# Patient Record
Sex: FEMALE | Race: WHITE | Employment: FULL TIME | URBAN - METROPOLITAN AREA
[De-identification: names, ages, dates, MRNs, and addresses within clinical notes are randomized per-mention and may not be internally consistent; named-entity substitution may affect disease eponyms.]

---

## 2017-10-03 ENCOUNTER — TELEPHONE (OUTPATIENT)
Dept: OBGYN | Age: 42
End: 2017-10-03

## 2017-10-03 DIAGNOSIS — N76.0 ACUTE VAGINITIS: Primary | ICD-10-CM

## 2017-10-03 RX ORDER — METRONIDAZOLE 500 MG/1
TABLET ORAL
Qty: 4 TABLET | Refills: 0 | Status: SHIPPED | OUTPATIENT
Start: 2017-10-03 | End: 2017-10-17 | Stop reason: ALTCHOICE

## 2017-10-17 ENCOUNTER — OFFICE VISIT (OUTPATIENT)
Dept: OBGYN | Age: 42
End: 2017-10-17
Payer: COMMERCIAL

## 2017-10-17 ENCOUNTER — HOSPITAL ENCOUNTER (OUTPATIENT)
Age: 42
Setting detail: SPECIMEN
Discharge: HOME | End: 2017-10-17
Payer: COMMERCIAL

## 2017-10-17 VITALS
HEIGHT: 63 IN | BODY MASS INDEX: 21.09 KG/M2 | WEIGHT: 119 LBS | DIASTOLIC BLOOD PRESSURE: 76 MMHG | SYSTOLIC BLOOD PRESSURE: 108 MMHG

## 2017-10-17 DIAGNOSIS — N76.0 ACUTE VAGINITIS: Primary | ICD-10-CM

## 2017-10-17 DIAGNOSIS — N76.0 ACUTE VAGINITIS: ICD-10-CM

## 2017-10-17 PROCEDURE — 99214 OFFICE O/P EST MOD 30 MIN: CPT | Performed by: ADVANCED PRACTICE MIDWIFE

## 2017-10-17 PROCEDURE — 87510 GARDNER VAG DNA DIR PROBE: CPT

## 2017-10-17 PROCEDURE — 87591 N.GONORRHOEAE DNA AMP PROB: CPT

## 2017-10-17 PROCEDURE — 87660 TRICHOMONAS VAGIN DIR PROBE: CPT

## 2017-10-17 PROCEDURE — 87070 CULTURE OTHR SPECIMN AEROBIC: CPT

## 2017-10-17 PROCEDURE — 87491 CHLMYD TRACH DNA AMP PROBE: CPT

## 2017-10-17 PROCEDURE — 87480 CANDIDA DNA DIR PROBE: CPT

## 2017-10-17 RX ORDER — FLUCONAZOLE 150 MG/1
150 TABLET ORAL ONCE
Qty: 1 TABLET | Refills: 0 | Status: SHIPPED | OUTPATIENT
Start: 2017-10-17 | End: 2017-10-17

## 2017-10-17 ASSESSMENT — PATIENT HEALTH QUESTIONNAIRE - PHQ9
2. FEELING DOWN, DEPRESSED OR HOPELESS: 0
SUM OF ALL RESPONSES TO PHQ9 QUESTIONS 1 & 2: 0
SUM OF ALL RESPONSES TO PHQ QUESTIONS 1-9: 0
1. LITTLE INTEREST OR PLEASURE IN DOING THINGS: 0

## 2017-10-17 NOTE — PROGRESS NOTES
PROBLEM VISIT     Date of service: 10/17/2017    Joselyn Whipple  Is a 40 y.o. single female    PT's PCP is: Gracie Perez MD     : 1975                                             Subjective:       No LMP recorded. Patient has had a hysterectomy. OB History    Para Term  AB Living   5       1 3   SAB TAB Ectopic Molar Multiple Live Births   1                # Outcome Date GA Lbr Kevin/2nd Weight Sex Delivery Anes PTL Lv   5             4             3             2             1 SAB                    History   Smoking Status    Current Every Day Smoker    Packs/day: 5.00   Smokeless Tobacco    Never Used        History   Alcohol Use No       Allergies: Triple antibiotic [bacitracin-neomycin-polymyxin]      Current Outpatient Prescriptions:     fluconazole (DIFLUCAN) 150 MG tablet, Take 1 tablet by mouth once for 1 dose, Disp: 1 tablet, Rfl: 0    History   Sexual Activity    Sexual activity: Yes    Partners: Male       Last Yearly:  2016    Last pap:     Last HPV:     Chief Complaint   Patient presents with    Vaginal Discharge     C/O vaginal burning and white vaginal discharge. Symptoms for several weeks. Pt. used Flagyl without relief. PE:  Vital Signs  Blood pressure 108/76, height 5' 3\" (1.6 m), weight 119 lb (54 kg), not currently breastfeeding. Labs:    No results found for this visit on 10/17/17. NURSE: Ruthy TIRADO    HPI: here for vaginal burning, is concerned about hx of warts     PT denies fever, chills, nausea and vomiting       Objective   Pelvic Exam: GENITAL/URINARY:  External Genitalia:  General appearance; normal, Hair distribution; normal, Lesions absent  Urethral Meatus:  Size normal, Location normal, Lesions absent, Prolapse absent  Urethra:   Fullness absent, Masses absent  Bladder:  Fullness absent, Masses absent, Tenderness absent, Cystocele absent  Vagina:  General appearance normal, Lesions absent, Pelvic support normal, white discharge and errythema  Anus/Perineum:  Lesions absent and Masses absent                                                           Assessment and Plan         1. Acute vaginitis  Aerobic Culture    C.trachomatis N.gonorrhoeae DNA    VAGINITIS DNA PROBE             I have discontinued Ms. Browne's Multiple Vitamins-Minerals (MULTI COMPLETE PO), Probiotic Product (PROBIOTIC DAILY PO), Garlic, folic acid, and metroNIDAZOLE. I am also having her start on fluconazole. No Follow-up on file. There are no Patient Instructions on file for this visit. Over 50% of time spent on counseling and care coordination on: see assessment and plan,  She was also counseled on her preventative health maintenance recommendations and follow-up.         FF time: 15 min      Wale Nguyen,10/17/2017 10:57 AM

## 2017-10-18 ENCOUNTER — TELEPHONE (OUTPATIENT)
Dept: OBGYN | Age: 42
End: 2017-10-18

## 2017-10-18 DIAGNOSIS — N76.0 BV (BACTERIAL VAGINOSIS): Primary | ICD-10-CM

## 2017-10-18 DIAGNOSIS — B96.89 BV (BACTERIAL VAGINOSIS): Primary | ICD-10-CM

## 2017-10-18 LAB
C TRACH DNA GENITAL QL NAA+PROBE: NEGATIVE
DIRECT EXAM: ABNORMAL
Lab: ABNORMAL
N. GONORRHOEAE DNA: NEGATIVE
SPECIMEN DESCRIPTION: ABNORMAL
SPECIMEN DESCRIPTION: ABNORMAL
STATUS: ABNORMAL

## 2017-10-18 RX ORDER — METRONIDAZOLE 500 MG/1
TABLET ORAL
Qty: 4 TABLET | Refills: 3 | Status: SHIPPED | OUTPATIENT
Start: 2017-10-18 | End: 2018-08-29 | Stop reason: ALTCHOICE

## 2017-10-20 LAB
CULTURE: NORMAL
Lab: NORMAL
SPECIMEN DESCRIPTION: NORMAL
SPECIMEN DESCRIPTION: NORMAL
STATUS: NORMAL

## 2018-01-18 DIAGNOSIS — B37.31 YEAST VAGINITIS: Primary | ICD-10-CM

## 2018-01-18 RX ORDER — FLUCONAZOLE 150 MG/1
150 TABLET ORAL ONCE
Qty: 1 TABLET | Refills: 0 | Status: SHIPPED | OUTPATIENT
Start: 2018-01-18 | End: 2018-01-18

## 2018-07-30 ENCOUNTER — TELEPHONE (OUTPATIENT)
Dept: OBGYN | Age: 43
End: 2018-07-30

## 2018-07-30 DIAGNOSIS — B37.31 VAGINAL YEAST INFECTION: ICD-10-CM

## 2018-07-30 DIAGNOSIS — N76.0 BV (BACTERIAL VAGINOSIS): Primary | ICD-10-CM

## 2018-07-30 DIAGNOSIS — B96.89 BV (BACTERIAL VAGINOSIS): Primary | ICD-10-CM

## 2018-07-30 RX ORDER — METRONIDAZOLE 500 MG/1
2000 TABLET ORAL ONCE
Qty: 4 TABLET | Refills: 0 | Status: SHIPPED | OUTPATIENT
Start: 2018-07-30 | End: 2018-07-30

## 2018-07-30 RX ORDER — FLUCONAZOLE 150 MG/1
150 TABLET ORAL ONCE
Qty: 1 TABLET | Refills: 0 | Status: SHIPPED | OUTPATIENT
Start: 2018-07-30 | End: 2018-07-30

## 2018-07-30 NOTE — TELEPHONE ENCOUNTER
Patient calling asking for prescription for BV, also would like Diflucan for prevention. Will you give something?

## 2018-08-29 ENCOUNTER — OFFICE VISIT (OUTPATIENT)
Dept: OBGYN | Age: 43
End: 2018-08-29
Payer: COMMERCIAL

## 2018-08-29 VITALS
WEIGHT: 124 LBS | DIASTOLIC BLOOD PRESSURE: 60 MMHG | HEIGHT: 63 IN | BODY MASS INDEX: 21.97 KG/M2 | SYSTOLIC BLOOD PRESSURE: 104 MMHG

## 2018-08-29 DIAGNOSIS — N63.15 BREAST LUMP ON RIGHT SIDE AT 9 O'CLOCK POSITION: Primary | ICD-10-CM

## 2018-08-29 PROCEDURE — 99213 OFFICE O/P EST LOW 20 MIN: CPT | Performed by: ADVANCED PRACTICE MIDWIFE

## 2018-08-29 NOTE — PROGRESS NOTES
and plan,  She was also counseled on her preventative health maintenance recommendations and follow-up.         FF time: 15 min      Agatha Nguyen,9/9/2018 7:13 PM

## 2018-08-31 ENCOUNTER — HOSPITAL ENCOUNTER (OUTPATIENT)
Dept: WOMENS IMAGING | Age: 43
Discharge: HOME | End: 2018-09-02
Payer: COMMERCIAL

## 2018-08-31 ENCOUNTER — HOSPITAL ENCOUNTER (OUTPATIENT)
Dept: ULTRASOUND IMAGING | Age: 43
Discharge: HOME | End: 2018-09-02
Payer: COMMERCIAL

## 2018-08-31 DIAGNOSIS — N63.15 BREAST LUMP ON RIGHT SIDE AT 9 O'CLOCK POSITION: ICD-10-CM

## 2018-08-31 PROCEDURE — 76641 ULTRASOUND BREAST COMPLETE: CPT

## 2018-08-31 PROCEDURE — 77066 DX MAMMO INCL CAD BI: CPT

## 2018-09-04 DIAGNOSIS — N63.0 BREAST MASS IN FEMALE: Primary | ICD-10-CM

## 2018-09-27 ENCOUNTER — TELEPHONE (OUTPATIENT)
Dept: OBGYN | Age: 43
End: 2018-09-27

## 2018-10-03 NOTE — TELEPHONE ENCOUNTER
Spoke with Edouard Mckenzie, she stated that her symptoms are  Gone. She already has $1000.00 of bills related to this so is concerned about more medical costs. Patient states if she waits until after Oct. 15 th.she can have a mammogram and insurance will pay. How would you like to handle it. ?

## 2018-10-23 ENCOUNTER — TELEPHONE (OUTPATIENT)
Dept: OBGYN | Age: 43
End: 2018-10-23

## 2018-10-23 DIAGNOSIS — Z87.898 HISTORY OF LUMP OF RIGHT BREAST: Primary | ICD-10-CM

## 2018-11-06 ENCOUNTER — HOSPITAL ENCOUNTER (OUTPATIENT)
Dept: ULTRASOUND IMAGING | Age: 43
Discharge: HOME | End: 2018-11-08
Payer: COMMERCIAL

## 2018-11-06 DIAGNOSIS — Z87.898 HISTORY OF LUMP OF RIGHT BREAST: ICD-10-CM

## 2018-11-06 PROCEDURE — 76641 ULTRASOUND BREAST COMPLETE: CPT

## 2022-08-10 ENCOUNTER — HOSPITAL ENCOUNTER (OUTPATIENT)
Dept: WOMENS IMAGING | Age: 47
Discharge: HOME | End: 2022-08-12
Payer: COMMERCIAL

## 2022-08-10 DIAGNOSIS — Z12.31 BREAST CANCER SCREENING BY MAMMOGRAM: ICD-10-CM

## 2022-08-10 PROCEDURE — 77067 SCR MAMMO BI INCL CAD: CPT

## 2023-02-08 ENCOUNTER — HOSPITAL ENCOUNTER (OUTPATIENT)
Age: 48
Setting detail: SPECIMEN
Discharge: HOME | End: 2023-02-08
Payer: COMMERCIAL

## 2023-02-08 ENCOUNTER — OFFICE VISIT (OUTPATIENT)
Dept: OBGYN | Age: 48
End: 2023-02-08
Payer: COMMERCIAL

## 2023-02-08 VITALS
SYSTOLIC BLOOD PRESSURE: 120 MMHG | WEIGHT: 115.6 LBS | HEIGHT: 63 IN | DIASTOLIC BLOOD PRESSURE: 78 MMHG | BODY MASS INDEX: 20.48 KG/M2

## 2023-02-08 DIAGNOSIS — Z01.419 ENCOUNTER FOR ANNUAL ROUTINE GYNECOLOGICAL EXAMINATION: Primary | ICD-10-CM

## 2023-02-08 DIAGNOSIS — R23.2 HOT FLASHES: ICD-10-CM

## 2023-02-08 DIAGNOSIS — Z12.31 SCREENING MAMMOGRAM, ENCOUNTER FOR: ICD-10-CM

## 2023-02-08 DIAGNOSIS — Z12.72 SCREENING FOR VAGINAL CANCER: ICD-10-CM

## 2023-02-08 PROCEDURE — 82670 ASSAY OF TOTAL ESTRADIOL: CPT

## 2023-02-08 PROCEDURE — G8484 FLU IMMUNIZE NO ADMIN: HCPCS | Performed by: ADVANCED PRACTICE MIDWIFE

## 2023-02-08 PROCEDURE — 36415 COLL VENOUS BLD VENIPUNCTURE: CPT | Performed by: ADVANCED PRACTICE MIDWIFE

## 2023-02-08 PROCEDURE — 83002 ASSAY OF GONADOTROPIN (LH): CPT

## 2023-02-08 PROCEDURE — 99396 PREV VISIT EST AGE 40-64: CPT | Performed by: ADVANCED PRACTICE MIDWIFE

## 2023-02-08 PROCEDURE — G0145 SCR C/V CYTO,THINLAYER,RESCR: HCPCS

## 2023-02-08 PROCEDURE — 83001 ASSAY OF GONADOTROPIN (FSH): CPT

## 2023-02-08 ASSESSMENT — ENCOUNTER SYMPTOMS
BACK PAIN: 0
SHORTNESS OF BREATH: 0
ABDOMINAL PAIN: 0

## 2023-02-08 NOTE — PROGRESS NOTES
YEARLY PHYSICAL    Date of service: 2023    Loli Velez  Is a 56 y.o.  single, female    PT's PCP is: Margaret Monterroso MD     : 1975                                             Subjective:       No LMP recorded. Patient has had a hysterectomy. Are your menses regular: not applicable    OB History    Para Term  AB Living   5 3 3   2 2   SAB IAB Ectopic Molar Multiple Live Births   1 1              # Outcome Date GA Lbr Kevin/2nd Weight Sex Delivery Anes PTL Lv   5 Term            4 Term            3 Term            2 IAB            1 SAB                 Social History     Tobacco Use   Smoking Status Every Day    Packs/day: 5.00    Types: Cigarettes   Smokeless Tobacco Never        Social History     Substance and Sexual Activity   Alcohol Use No       Family History   Problem Relation Age of Onset    Hypertension Father     Stroke Father     Other Other         PGGM ovarian cancer. No family h/o breast cancer. Any family history of breast or ovarian cancer: No    Any family history of blood clots: Yes    Have you had a positive covid test: No    Have you had the covid immunization: Yes      Allergies: Triple antibiotic [bacitracin-neomycin-polymyxin]    No current outpatient medications on file. Social History     Substance and Sexual Activity   Sexual Activity Not Currently    Partners: Male       Any bleeding or pain with intercourse: na    Last Yearly date:  2018    Last pap date and results: 2018?     Last HPV date and results: unknown    Last Mammogram date and results: 2022 negative    Last Dexa scan date and results: never    Last colorectal screen- type:never*  date  never results never    Do you do self breast exams: Yes    Past Medical History:   Diagnosis Date    Headache(784.0)     Mitral valvular prolapse        Past Surgical History:   Procedure Laterality Date    CYST REMOVAL      neck Viv [place]    TUBAL LIGATION      WISDOM TOOTH EXTRACTION         Family History   Problem Relation Age of Onset    Hypertension Father     Stroke Father     Other Other         PGGM ovarian cancer. No family h/o breast cancer. Chief Complaint   Patient presents with    Gynecologic Exam     Yearly exam. Last pap ? PE:  Vital Signs  Blood pressure 120/78, height 5' 3\" (1.6 m), weight 115 lb 9.6 oz (52.4 kg), not currently breastfeeding. Estimated body mass index is 20.48 kg/m² as calculated from the following:    Height as of this encounter: 5' 3\" (1.6 m). Weight as of this encounter: 115 lb 9.6 oz (52.4 kg). Labs:    No results found for this visit on 02/08/23. No data recorded    NURSE: Ursula TIRADO    HPI: here for annual gyn exam, denies c/o    Review of Systems   Constitutional: Negative. HENT:  Negative for congestion. Respiratory:  Negative for shortness of breath. Cardiovascular:  Negative for chest pain. Gastrointestinal:  Negative for abdominal pain. Genitourinary:  Negative for dysuria. Musculoskeletal:  Negative for back pain. Skin:  Negative for rash. Neurological:  Negative for headaches. Psychiatric/Behavioral:  The patient is not nervous/anxious. Objective  Lymphatic:   no lymphadenopathy  Heent:   negative   Cor: regular rate and rhythm, no murmurs              Pul:clear to auscultation bilaterally- no wheezes, rales or rhonchi, normal air movement, no respiratory distress      GI: Abdomen soft, non-tender. BS normal. No masses,  No organomegaly           Extremities: normal strength, tone, and muscle mass   Breasts: Breast:normal appearance, no masses or tenderness   Pelvic Exam: GENITAL/URINARY:  External Genitalia:  General appearance; normal, Hair distribution; normal, Lesions absent  Urethral Meatus:  Size normal, Location normal, Lesions absent, Prolapse absent  Urethra:   Fullness absent, Masses absent  Bladder:  Fullness absent, Masses absent, Tenderness absent, Cystocele absent  Vagina:  General appearance normal, Estrogen effect normal, Discharge absent, Lesions absent, Pelvic support normal  Cervix:  General appearance normal, Lesions absent, Discharge absent, Tenderness absent, Enlargement absent, Nodularity absent  Uterus:  Size normal, Tenderness absent  Adenexa: Masses absent, Tenderness absent  Anus/Perineum:  Lesions absent and Masses absent                                                       Assessment and Plan          Diagnosis Orders   1. Encounter for annual routine gynecological examination        2. Screening for vaginal cancer  PAP SMEAR      3. Screening mammogram, encounter for  [facility] PRITESH DIGITAL SCREEN BILATERAL      4. Hot flashes  Follicle Stimulating Hormone    Luteinizing Hormone    Estradiol                Fiordaliza Sweeney does not currently have medications on file. Return in about 1 year (around 2/8/2024) for yearly. She was also counseled on her preventative health maintenance recommendations and follow-up. There are no Patient Instructions on file for this visit.     Lennox Sauger, APRN - CNM,2/8/2023 3:27 PM

## 2023-02-09 DIAGNOSIS — Z78.0 MENOPAUSE: Primary | ICD-10-CM

## 2023-02-09 LAB
ESTRADIOL LEVEL: <5 PG/ML (ref 27–314)
FOLLICLE STIMULATING HORMONE: 101 MIU/ML (ref 1.7–21.5)
LH: 51.1 MIU/ML (ref 1–95.6)

## 2023-02-13 DIAGNOSIS — Z78.0 MENOPAUSE: Primary | ICD-10-CM

## 2024-01-03 ENCOUNTER — TELEPHONE (OUTPATIENT)
Dept: SURGERY | Age: 49
End: 2024-01-03

## 2024-01-03 NOTE — TELEPHONE ENCOUNTER
[facility] Colon[facility] Worksheet    Patient Name: Joaquina Browne  : 1975  Primary Care Physician: Blake Cleaning MD  Today's Date: 1/3/2024    Surgery Location:   [facility]  [x] [place] [] Other    Why has a Colonoscopy been recommended for you?   - Colonoscopy Screening    Are you currently having any of the following symptoms?  [] Rectal Bleeding (K62.5) [] Bloody Stool (K92.1) [] Dark Tarry Stool (K92.1)  [] Fecal Occult Positive Blood (confirmed by blood test R19.5)  [x] Anemia (low hemoglobin D64.9) [] Abdominal Pain (R10.84) [] Diarrhea (R19.7)    Have had a colonoscopy before?  [] Yes  [x] No    If so, where and when was that done?   -     Was anything found during the last scope?  -     Do you have a family history of colon cancer?   - no    Family History   Problem Relation Age of Onset    Hypertension Father     Stroke Father     Other Other         PGGM ovarian cancer. No family h/o breast cancer.          How much caffeine do you drink in a day?   - 2 cups of coffee, 1-2 cans of soda    Any tobacco use?    [x] Yes    [] No  1/2 a pack a day  Any alcohol use?     [] Yes    [x] No  If yes, how often?       In the last six (6) months have you experienced any of the following symptoms?   [] Blood from the rectum or stool  [] Abdominal Pain   [] Diarrhea  [] Itching of rectum   [] Vomiting  [] Constipation   [] Black stools  [] Bloating   [] Mucous in your stool  [x] [place]   [] Change in bowel habits    Do you have any medication allergies?   [] Yes  If yes, please list:   [x] No    Do you take Warfarin, Coumadin, Plavix, Eliquis, Xarelto, or aspirin OR do you take a medication that thins your blood?  [] Yes  [x] No    Medications:    No current outpatient medications on file prior to visit.     No current facility-administered medications on file prior to visit.         Surgical History:    Past Surgical History:   Procedure Laterality Date    CYST REMOVAL      neck    ENDOMETRIAL

## 2024-01-22 RX ORDER — IBUPROFEN 200 MG
200 TABLET ORAL EVERY 6 HOURS PRN
COMMUNITY

## 2024-01-22 NOTE — PROGRESS NOTES
Patient states they received their colon prep instructions and home medications that are to be taken on the day of their procedure with a small sip of water only, from the physician's office. Instructed pt to stop taking ibuprofen 7 days prior to surgery and to stop marijuana 5 days prior to surgery.

## 2024-01-23 ENCOUNTER — HOSPITAL ENCOUNTER (OUTPATIENT)
Dept: WOMENS IMAGING | Age: 49
Discharge: HOME | End: 2024-01-25
Attending: ADVANCED PRACTICE MIDWIFE
Payer: COMMERCIAL

## 2024-01-23 DIAGNOSIS — Z78.0 MENOPAUSE: ICD-10-CM

## 2024-01-23 DIAGNOSIS — Z12.31 SCREENING MAMMOGRAM, ENCOUNTER FOR: ICD-10-CM

## 2024-01-23 PROCEDURE — 77063 BREAST TOMOSYNTHESIS BI: CPT

## 2024-01-23 PROCEDURE — 77080 DXA BONE DENSITY AXIAL: CPT

## 2024-02-05 ENCOUNTER — ANESTHESIA EVENT (OUTPATIENT)
Dept: OPERATING ROOM | Age: 49
End: 2024-02-05
Payer: COMMERCIAL

## 2024-02-05 PROBLEM — Z12.11 COLON CANCER SCREENING: Status: ACTIVE | Noted: 2024-02-05

## 2024-02-06 ENCOUNTER — ANESTHESIA (OUTPATIENT)
Dept: OPERATING ROOM | Age: 49
End: 2024-02-06
Payer: COMMERCIAL

## 2024-02-06 ENCOUNTER — HOSPITAL ENCOUNTER (OUTPATIENT)
Age: 49
Setting detail: OUTPATIENT SURGERY
Discharge: HOME | End: 2024-02-06
Attending: SURGERY | Admitting: SURGERY
Payer: COMMERCIAL

## 2024-02-06 VITALS
BODY MASS INDEX: 22.15 KG/M2 | TEMPERATURE: 97.3 F | OXYGEN SATURATION: 98 % | RESPIRATION RATE: 16 BRPM | SYSTOLIC BLOOD PRESSURE: 104 MMHG | HEIGHT: 63 IN | HEART RATE: 73 BPM | WEIGHT: 125 LBS | DIASTOLIC BLOOD PRESSURE: 64 MMHG

## 2024-02-06 PROCEDURE — 2500000003 HC RX 250 WO HCPCS: Performed by: NURSE ANESTHETIST, CERTIFIED REGISTERED

## 2024-02-06 PROCEDURE — 7100000010 HC PHASE II RECOVERY - FIRST 15 MIN: Performed by: SURGERY

## 2024-02-06 PROCEDURE — 3700000000 HC ANESTHESIA ATTENDED CARE: Performed by: SURGERY

## 2024-02-06 PROCEDURE — 2580000003 HC RX 258: Performed by: NURSE ANESTHETIST, CERTIFIED REGISTERED

## 2024-02-06 PROCEDURE — 7100000011 HC PHASE II RECOVERY - ADDTL 15 MIN: Performed by: SURGERY

## 2024-02-06 PROCEDURE — 3609156700 HC PROCTOSIGMOIDOSCOPY DX: Performed by: SURGERY

## 2024-02-06 PROCEDURE — 6360000002 HC RX W HCPCS: Performed by: NURSE ANESTHETIST, CERTIFIED REGISTERED

## 2024-02-06 PROCEDURE — 2709999900 HC NON-CHARGEABLE SUPPLY: Performed by: SURGERY

## 2024-02-06 RX ORDER — SODIUM CHLORIDE 9 MG/ML
INJECTION, SOLUTION INTRAVENOUS PRN
Status: DISCONTINUED | OUTPATIENT
Start: 2024-02-06 | End: 2024-02-06 | Stop reason: HOSPADM

## 2024-02-06 RX ORDER — SODIUM CHLORIDE 0.9 % (FLUSH) 0.9 %
5-40 SYRINGE (ML) INJECTION EVERY 12 HOURS SCHEDULED
Status: DISCONTINUED | OUTPATIENT
Start: 2024-02-06 | End: 2024-02-06 | Stop reason: HOSPADM

## 2024-02-06 RX ORDER — SODIUM CHLORIDE, SODIUM LACTATE, POTASSIUM CHLORIDE, CALCIUM CHLORIDE 600; 310; 30; 20 MG/100ML; MG/100ML; MG/100ML; MG/100ML
INJECTION, SOLUTION INTRAVENOUS CONTINUOUS
Status: DISCONTINUED | OUTPATIENT
Start: 2024-02-06 | End: 2024-02-06 | Stop reason: HOSPADM

## 2024-02-06 RX ORDER — PROPOFOL 10 MG/ML
INJECTION, EMULSION INTRAVENOUS PRN
Status: DISCONTINUED | OUTPATIENT
Start: 2024-02-06 | End: 2024-02-06 | Stop reason: SDUPTHER

## 2024-02-06 RX ORDER — SODIUM CHLORIDE 0.9 % (FLUSH) 0.9 %
5-40 SYRINGE (ML) INJECTION PRN
Status: DISCONTINUED | OUTPATIENT
Start: 2024-02-06 | End: 2024-02-06 | Stop reason: HOSPADM

## 2024-02-06 RX ADMIN — LIDOCAINE HYDROCHLORIDE 100 MG: 20 INJECTION, SOLUTION EPIDURAL; INFILTRATION; INTRACAUDAL at 11:38

## 2024-02-06 RX ADMIN — PROPOFOL 60 MG: 10 INJECTION, EMULSION INTRAVENOUS at 11:38

## 2024-02-06 RX ADMIN — PROPOFOL 180 MCG/KG/MIN: 10 INJECTION, EMULSION INTRAVENOUS at 11:39

## 2024-02-06 RX ADMIN — PHENYLEPHRINE HYDROCHLORIDE 100 MCG: 10 INJECTION INTRAVENOUS at 11:41

## 2024-02-06 RX ADMIN — SODIUM CHLORIDE, POTASSIUM CHLORIDE, SODIUM LACTATE AND CALCIUM CHLORIDE: 600; 310; 30; 20 INJECTION, SOLUTION INTRAVENOUS at 10:33

## 2024-02-06 ASSESSMENT — LIFESTYLE VARIABLES: SMOKING_STATUS: 1

## 2024-02-06 ASSESSMENT — PAIN - FUNCTIONAL ASSESSMENT
PAIN_FUNCTIONAL_ASSESSMENT: FACE, LEGS, ACTIVITY, CRY, AND CONSOLABILITY (FLACC)
PAIN_FUNCTIONAL_ASSESSMENT: NONE - DENIES PAIN
PAIN_FUNCTIONAL_ASSESSMENT: 0-10

## 2024-02-06 NOTE — H&P
masses, no hernias, no HSM, no bruits.    Neuro: Normal speech, motor/sensory grossly normal bilateral    MSK: No joint tenderness, deformity, or swelling           ASSESSMENT:  1) Colon Cancer Screening    PLAN:  1) Colon Cancer Screening - Risks and benefits of colonoscopy were discussed with Joaquina Browne.  In particular I discussed the possibility of incomplete colonoscopy and failure to make a diagnosis.  I also discussed the risks and consequences of reactions to the sedatives, bleeding and perforation.  Alternate ways of evaluating the colon including barium enema, CT colonography and sigmoidoscopy were discussed.  she was also given the opportunity to have any questions answered, and encouraged to call the office with additional issues.      Electronically signed by FLORENCIA THOMAS MD on 2/6/2024 at 10:46 AM

## 2024-02-06 NOTE — OP NOTE
Operative Note      Patient: Joaquina Browne  YOB: 1975  MRN: [id number]    Date of Procedure: 2/6/2024    Pre-Op Diagnosis Codes:     * Screening for colon cancer [Z12.11]    Post-Op Diagnosis:  Procedure aborted due to inadequate prep       Procedure: Incomplete colonoscopy    Surgeon(s):  Flornecia Diaz MD    Assistant:   * No surgical staff found *    Anesthesia: Monitor Anesthesia Care    Estimated Blood Loss (mL): None    Complications: Other: Inadequate prer    Specimens:   * No specimens in log *    Implants:  * No implants in log *      Drains: * No LDAs found *    Patient was brought to the endoscopy area and IV sedation was used centimeters by the CRNA.  Scope was put in his rectum.  Put the scope in the rectum and there was formed stool there is hoping just a small amount and I can get past it but once I get the scope in the rectum there was more formed stool the completely filled the lumen and I cannot see beyond the stool.  The scope was only passed about 15 cm before I stopped the procedure.  She will need to try this again with an adequate prep    Electronically signed by FLORENCIA DIAZ MD on 2/6/2024 at 11:43 AM

## 2024-02-06 NOTE — PROGRESS NOTES
Dr. Diaz in to speak to pt regarding inability to complete procedure, options given to repeat another time or speak to PCP and do cologaurd. Pt states she will think about options and verbalizes understanding.

## 2024-02-06 NOTE — ANESTHESIA PRE PROCEDURE
Department of Anesthesiology  Preprocedure Note       Name:  Joaquina Browne   Age:  48 y.o.  :  1975                                          MRN:  [id number]         Date:  2024      Surgeon: Surgeon(s):  Ben Diaz MD    Procedure: Procedure(s):  COLORECTAL CANCER SCREENING, NOT HIGH RISK    Medications prior to admission:   Prior to Admission medications    Medication Sig Start Date End Date Taking? Authorizing Provider   ibuprofen (ADVIL;MOTRIN) 200 MG tablet Take 1 tablet by mouth every 6 hours as needed for Pain   Yes Provider, MD Pablo       Current medications:    Current Facility-Administered Medications   Medication Dose Route Frequency Provider Last Rate Last Admin   • lactated ringers IV soln infusion   IntraVENous Continuous Ben Diaz MD       • lactated ringers IV soln infusion   IntraVENous Continuous Gely Mari APRN - CRNA           Allergies:    Allergies   Allergen Reactions   • Triple Antibiotic [Bacitracin-Neomycin-Polymyxin] Hives       Problem List:    Patient Active Problem List   Diagnosis Code   • Colon cancer screening Z12.11       Past Medical History:        Diagnosis Date   • Headache(784.0)    • Mitral valvular prolapse        Past Surgical History:        Procedure Laterality Date   • CYST REMOVAL      neck   • ENDOMETRIAL ABLATION     • ROBOTIC ASSISTED HYSTERECTOMY      [facility] Dr. Frazier   • TUBAL LIGATION     • WISDOM TOOTH EXTRACTION         Social History:    Social History     Tobacco Use   • Smoking status: Every Day     Current packs/day: 5.00     Types: Cigarettes   • Smokeless tobacco: Never   Substance Use Topics   • Alcohol use: No                                Ready to quit: Not Answered  Counseling given: Not Answered      Vital Signs (Current):   Vitals:    24 1311 24 1018   TempSrc:  Temporal   Weight: 56.7 kg (125 lb)    Height: 1.6 m (5' 3\")                                               BP Readings from Last 3 Encounters:

## 2024-02-06 NOTE — ANESTHESIA POSTPROCEDURE EVALUATION
Department of Anesthesiology  Postprocedure Note    Patient: Joaquina Browne  MRN: [id number]  YOB: 1975  Date of evaluation: 2/6/2024    Procedure Summary       Date: 02/06/24 Room / Location: [place] [id number] / [facility]    Anesthesia Start: 1135 Anesthesia Stop: 1148    Procedure: ANAL PROCTO SIGMOIDOSCOPY FLEXIBLE Diagnosis:       Screening for colon cancer      (Screening for colon cancer [Z12.11])    Surgeons: Ben Diaz MD Responsible Provider: Jakub Khalil APRN - CRNA    Anesthesia Type: general, TIVA ASA Status: 2            Anesthesia Type: No value filed.    Dalton Phase I: Dalton Score: 10    Dalton Phase II: Dalton Score: 10    Anesthesia Post Evaluation    Patient location during evaluation: PACU  Patient participation: complete - patient participated  Level of consciousness: awake and alert  Airway patency: patent  Nausea & Vomiting: no nausea and no vomiting  Cardiovascular status: blood pressure returned to baseline  Respiratory status: acceptable  Hydration status: euvolemic  Pain management: adequate and satisfactory to patient    No notable events documented.

## 2024-02-06 NOTE — DISCHARGE INSTRUCTIONS
routine as soon as you are comfortable to do so, which is usually the next day after the procedure.    Medications    When taking medications, it's important to:   Take your medication as directednot more, not less, not at a different time.   Do not stop taking them without consulting your healthcare provider.   Don't share them with anyone else.   Know what effects and side effects to expect, and report them to your healthcare provider.   If you are taking more than one drug, even if it is an over-the-counter medication, herb, or dietary supplement, be sure to check with a physician or pharmacist about drug interactions.   Plan ahead for refills so you don't run out.     Follow-up   You will be called with your results usually within a week or  We will have you make a follow up appointment  You are always welcome to follow up in person if you have questions or there are other issue you would like addressed      Call Your Doctor If Any of the Following Occurs   Monitor your recovery once you leave the hospital. As soon as you have a problem, alert your doctor. If any of the following occur, call your doctor or come to the emergency room  Bleeding from your rectum; notify your doctor if you pass a teaspoonful or more of blood   Black, tarry stools   Severe abdominal pain   Hard, swollen abdomen   Signs of infection, including fever or chills   Inability to pass gas or stool   Coughing, shortness of breath, chest pain, severe nausea or vomiting   In case of an emergency, call 911 immediately.

## 2024-03-06 PROBLEM — Z12.11 COLON CANCER SCREENING: Status: RESOLVED | Noted: 2024-02-05 | Resolved: 2024-03-06

## 2025-02-12 ENCOUNTER — HOSPITAL ENCOUNTER (OUTPATIENT)
Dept: WOMENS IMAGING | Age: 50
Discharge: HOME | End: 2025-02-14
Payer: COMMERCIAL

## 2025-02-12 DIAGNOSIS — Z12.31 ENCOUNTER FOR SCREENING MAMMOGRAM FOR MALIGNANT NEOPLASM OF BREAST: ICD-10-CM

## 2025-02-12 DIAGNOSIS — Z00.00 ENCOUNTER FOR PREVENTIVE HEALTH EXAMINATION: ICD-10-CM

## 2025-02-12 PROCEDURE — 77067 SCR MAMMO BI INCL CAD: CPT

## (undated) DEVICE — SOLUTION IRRIG 1000ML 0.9% SOD CHL USP POUR PLAS BTL

## (undated) DEVICE — TUBING SUCT NON-STRL 9/32X100 W/CNNT

## (undated) DEVICE — CANNULA ORAL NSL AD CO2 N INTUB O2 DEL DISP TRU LNK

## (undated) DEVICE — COLONOSCOPE ENDOSCP ABSORBENT SM PEDIATRIC 104 MM VISION